# Patient Record
Sex: FEMALE | Race: WHITE | NOT HISPANIC OR LATINO | Employment: PART TIME | URBAN - METROPOLITAN AREA
[De-identification: names, ages, dates, MRNs, and addresses within clinical notes are randomized per-mention and may not be internally consistent; named-entity substitution may affect disease eponyms.]

---

## 2018-07-20 PROBLEM — F14.11 COCAINE ABUSE IN REMISSION (H): Status: ACTIVE | Noted: 2018-07-20

## 2018-07-20 PROBLEM — G89.29 CHRONIC BILATERAL THORACIC BACK PAIN: Status: ACTIVE | Noted: 2018-07-20

## 2018-07-20 PROBLEM — M54.6 CHRONIC BILATERAL THORACIC BACK PAIN: Status: ACTIVE | Noted: 2018-07-20

## 2018-07-20 PROBLEM — F17.210 CIGARETTE NICOTINE DEPENDENCE WITHOUT COMPLICATION: Status: ACTIVE | Noted: 2018-07-20

## 2018-07-20 PROBLEM — B35.1 ONYCHOMYCOSIS: Status: ACTIVE | Noted: 2018-07-20

## 2021-06-02 ENCOUNTER — RECORDS - HEALTHEAST (OUTPATIENT)
Dept: ADMINISTRATIVE | Facility: CLINIC | Age: 36
End: 2021-06-02

## 2024-02-12 ENCOUNTER — TELEPHONE (OUTPATIENT)
Dept: FAMILY MEDICINE | Facility: CLINIC | Age: 39
End: 2024-02-12

## 2024-02-12 ENCOUNTER — OFFICE VISIT (OUTPATIENT)
Dept: FAMILY MEDICINE | Facility: CLINIC | Age: 39
End: 2024-02-12

## 2024-02-12 ENCOUNTER — ANCILLARY PROCEDURE (OUTPATIENT)
Dept: MAMMOGRAPHY | Facility: CLINIC | Age: 39
End: 2024-02-12
Attending: FAMILY MEDICINE

## 2024-02-12 VITALS
OXYGEN SATURATION: 97 % | HEART RATE: 85 BPM | WEIGHT: 199 LBS | SYSTOLIC BLOOD PRESSURE: 114 MMHG | RESPIRATION RATE: 16 BRPM | TEMPERATURE: 98.3 F | BODY MASS INDEX: 33.97 KG/M2 | DIASTOLIC BLOOD PRESSURE: 73 MMHG | HEIGHT: 64 IN

## 2024-02-12 DIAGNOSIS — Z12.31 VISIT FOR SCREENING MAMMOGRAM: ICD-10-CM

## 2024-02-12 DIAGNOSIS — F41.1 GENERALIZED ANXIETY DISORDER: ICD-10-CM

## 2024-02-12 DIAGNOSIS — F17.200 NICOTINE DEPENDENCE, UNCOMPLICATED, UNSPECIFIED NICOTINE PRODUCT TYPE: ICD-10-CM

## 2024-02-12 DIAGNOSIS — Z00.00 ROUTINE GENERAL MEDICAL EXAMINATION AT A HEALTH CARE FACILITY: Primary | ICD-10-CM

## 2024-02-12 DIAGNOSIS — E78.5 HYPERLIPIDEMIA, UNSPECIFIED HYPERLIPIDEMIA TYPE: ICD-10-CM

## 2024-02-12 DIAGNOSIS — Z12.4 CERVICAL CANCER SCREENING: ICD-10-CM

## 2024-02-12 DIAGNOSIS — Z11.59 NEED FOR HEPATITIS C SCREENING TEST: ICD-10-CM

## 2024-02-12 PROBLEM — F19.10 POLYSUBSTANCE ABUSE (H): Status: ACTIVE | Noted: 2024-02-12

## 2024-02-12 LAB
ANION GAP SERPL CALCULATED.3IONS-SCNC: 9 MMOL/L (ref 7–15)
BUN SERPL-MCNC: 6.9 MG/DL (ref 6–20)
CALCIUM SERPL-MCNC: 9.1 MG/DL (ref 8.6–10)
CHLORIDE SERPL-SCNC: 106 MMOL/L (ref 98–107)
CHOLEST SERPL-MCNC: 239 MG/DL
CREAT SERPL-MCNC: 0.74 MG/DL (ref 0.51–0.95)
DEPRECATED HCO3 PLAS-SCNC: 26 MMOL/L (ref 22–29)
EGFRCR SERPLBLD CKD-EPI 2021: >90 ML/MIN/1.73M2
FASTING STATUS PATIENT QL REPORTED: ABNORMAL
GLUCOSE SERPL-MCNC: 85 MG/DL (ref 70–99)
HBA1C MFR BLD: 5.9 % (ref 0–5.6)
HCV AB SERPL QL IA: NONREACTIVE
HDLC SERPL-MCNC: 32 MG/DL
LDLC SERPL CALC-MCNC: 161 MG/DL
NONHDLC SERPL-MCNC: 207 MG/DL
POTASSIUM SERPL-SCNC: 4.1 MMOL/L (ref 3.4–5.3)
SODIUM SERPL-SCNC: 141 MMOL/L (ref 135–145)
TRIGL SERPL-MCNC: 229 MG/DL
TSH SERPL DL<=0.005 MIU/L-ACNC: 0.95 UIU/ML (ref 0.3–4.2)

## 2024-02-12 PROCEDURE — 86803 HEPATITIS C AB TEST: CPT | Performed by: FAMILY MEDICINE

## 2024-02-12 PROCEDURE — 80061 LIPID PANEL: CPT | Performed by: FAMILY MEDICINE

## 2024-02-12 PROCEDURE — 87624 HPV HI-RISK TYP POOLED RSLT: CPT | Performed by: FAMILY MEDICINE

## 2024-02-12 PROCEDURE — 80048 BASIC METABOLIC PNL TOTAL CA: CPT | Performed by: FAMILY MEDICINE

## 2024-02-12 PROCEDURE — 36415 COLL VENOUS BLD VENIPUNCTURE: CPT | Performed by: FAMILY MEDICINE

## 2024-02-12 PROCEDURE — 83036 HEMOGLOBIN GLYCOSYLATED A1C: CPT | Performed by: FAMILY MEDICINE

## 2024-02-12 PROCEDURE — 99386 PREV VISIT NEW AGE 40-64: CPT | Mod: 25 | Performed by: FAMILY MEDICINE

## 2024-02-12 PROCEDURE — G0145 SCR C/V CYTO,THINLAYER,RESCR: HCPCS | Performed by: FAMILY MEDICINE

## 2024-02-12 PROCEDURE — 84443 ASSAY THYROID STIM HORMONE: CPT | Performed by: FAMILY MEDICINE

## 2024-02-12 PROCEDURE — 90471 IMMUNIZATION ADMIN: CPT | Performed by: FAMILY MEDICINE

## 2024-02-12 PROCEDURE — 90746 HEPB VACCINE 3 DOSE ADULT IM: CPT | Performed by: FAMILY MEDICINE

## 2024-02-12 PROCEDURE — 77067 SCR MAMMO BI INCL CAD: CPT | Mod: TC | Performed by: STUDENT IN AN ORGANIZED HEALTH CARE EDUCATION/TRAINING PROGRAM

## 2024-02-12 PROCEDURE — 90686 IIV4 VACC NO PRSV 0.5 ML IM: CPT | Performed by: FAMILY MEDICINE

## 2024-02-12 PROCEDURE — 90677 PCV20 VACCINE IM: CPT | Performed by: FAMILY MEDICINE

## 2024-02-12 PROCEDURE — 90472 IMMUNIZATION ADMIN EACH ADD: CPT | Performed by: FAMILY MEDICINE

## 2024-02-12 RX ORDER — BUPROPION HYDROCHLORIDE 150 MG/1
TABLET, FILM COATED, EXTENDED RELEASE ORAL
Qty: 57 TABLET | Refills: 0 | Status: SHIPPED | OUTPATIENT
Start: 2024-02-12 | End: 2024-03-13

## 2024-02-12 RX ORDER — BUPROPION HYDROCHLORIDE 150 MG/1
150 TABLET, FILM COATED, EXTENDED RELEASE ORAL 2 TIMES DAILY
Qty: 60 TABLET | Refills: 2 | Status: SHIPPED | OUTPATIENT
Start: 2024-03-13

## 2024-02-12 SDOH — HEALTH STABILITY: PHYSICAL HEALTH: ON AVERAGE, HOW MANY DAYS PER WEEK DO YOU ENGAGE IN MODERATE TO STRENUOUS EXERCISE (LIKE A BRISK WALK)?: 4 DAYS

## 2024-02-12 ASSESSMENT — SOCIAL DETERMINANTS OF HEALTH (SDOH): HOW OFTEN DO YOU GET TOGETHER WITH FRIENDS OR RELATIVES?: PATIENT DECLINED

## 2024-02-12 NOTE — LETTER
February 12, 2024      Odette Bladnon Bebe  [address] [facility] [address]        To Whom It May Concern,     Ms. Odette Blandon Bebe saw me on 2/12/2024.    She has mental health concerns and the presence of her dog is helpful in relieving stress.  I think she should keep this  animal as a treatment for her conditions.    Sincerely,          Rory Farris MD

## 2024-02-12 NOTE — PATIENT INSTRUCTIONS
"Eating Healthy Foods: Care Instructions  With every meal, you can make healthy food choices. Try to eat a variety of fruits, vegetables, whole grains, lean proteins, and low-fat dairy products. This can help you get the right balance of nutrients, including vitamins and minerals. Small changes add up over time. You can start by adding one healthy food to your meals each day.    Try to make half your plate fruits and vegetables, one-fourth whole grains, and one-fourth lean proteins. Try including dairy with your meals.   Eat more fruits and vegetables. Try to have them with most meals and snacks.   Foods for healthy eating    Fruits    These can be fresh, frozen, canned, or dried.  Try to choose whole fruit rather than fruit juice.  Eat a variety of colors.    Vegetables    These can be fresh, frozen, canned, or dried.  Beans, peas, and lentils count too.    Whole grains    Choose whole-grain breads, cereals, and noodles.  Try brown rice.    Lean proteins    These can include lean meat, poultry, fish, and eggs.  You can also have tofu, beans, peas, lentils, nuts, and seeds.    Dairy    Try milk, yogurt, and cheese.  Choose low-fat or fat-free when you can.  If you need to, use lactose-free milk or fortified plant-based milk products, such as soy milk.    Water    Drink water when you're thirsty.  Limit sugar-sweetened drinks, including soda, fruit drinks, and sports drinks.  Where can you learn more?  Go to https://www.[employer].net/patiented  Enter T756 in the search box to learn more about \"Eating Healthy Foods: Care Instructions.\"  Current as of: February 28, 2023               Content Version: 13.8    2661-1053 [employer].   Care instructions adapted under license by your healthcare professional. If you have questions about a medical condition or this instruction, always ask your healthcare professional. [employer] disclaims any warranty or liability for your use of this " information.      Preventive Care Advice   This is general advice given by our system to help you stay healthy. However, your care team may have specific advice just for you. Please talk to your care team about your preventive care needs.  Nutrition  Eat 5 or more servings of fruits and vegetables each day.  Try wheat bread, brown rice and whole grain pasta (instead of white bread, rice, and pasta).  Get enough calcium and vitamin D. Check the label on foods and aim for 100% of the RDA (recommended daily allowance).  Lifestyle  Exercise at least 150 minutes each week  (30 minutes a day, 5 days a week).  Do muscle strengthening activities 2 days a week. These help control your weight and prevent disease.  No smoking.  Wear sunscreen to prevent skin cancer.  Have a dental exam and cleaning every 6 months.  Yearly exams  See your health care team every year to talk about:  Any changes in your health.  Any medicines your care team has prescribed.  Preventive care, family planning, and ways to prevent chronic diseases.  Shots (vaccines)   HPV shots (up to age 26), if you've never had them before.  Hepatitis B shots (up to age 59), if you've never had them before.  COVID-19 shot: Get this shot when it's due.  Flu shot: Get a flu shot every year.  Tetanus shot: Get a tetanus shot every 10 years.  Pneumococcal, hepatitis A, and RSV shots: Ask your care team if you need these based on your risk.  Shingles shot (for age 50 and up)  General health tests  Diabetes screening:  Starting at age 35, Get screened for diabetes at least every 3 years.  If you are younger than age 35, ask your care team if you should be screened for diabetes.  Cholesterol test: At age 39, start having a cholesterol test every 5 years, or more often if advised.  Bone density scan (DEXA): At age 50, ask your care team if you should have this scan for osteoporosis (brittle bones).  Hepatitis C: Get tested at least once in your life.  STIs (sexually  transmitted infections)  Before age 24: Ask your care team if you should be screened for STIs.  After age 24: Get screened for STIs if you're at risk. You are at risk for STIs (including HIV) if:  You are sexually active with more than one person.  You don't use condoms every time.  You or a partner was diagnosed with a sexually transmitted infection.  If you are at risk for HIV, ask about PrEP medicine to prevent HIV.  Get tested for HIV at least once in your life, whether you are at risk for HIV or not.  Cancer screening tests  Cervical cancer screening: If you have a cervix, begin getting regular cervical cancer screening tests starting at age 21.  Breast cancer scan (mammogram): If you've ever had breasts, begin having regular mammograms starting at age 40. This is a scan to check for breast cancer.  Colon cancer screening: It is important to start screening for colon cancer at age 45.  Have a colonoscopy test every 10 years (or more often if you're at risk) Or, ask your provider about stool tests like a FIT test every year or Cologuard test every 3 years.  To learn more about your testing options, visit:   https://www."[employer]/267678.pdf.  For help making a decision, visit:   https://bit.ly/gu29543.  Prostate cancer screening test: If you have a prostate, ask your care team if a prostate cancer screening test (PSA) at age 55 is right for you.  Lung cancer screening: If you are a current or former smoker ages 50 to 80, ask your care team if ongoing lung cancer screenings are right for you.  For informational purposes only. Not to replace the advice of your health care provider. Copyright   2023 [employer]. All rights reserved. Clinically reviewed by the [facility] Transitions Program. eCurv 360452 - REV 01/24.

## 2024-02-12 NOTE — TELEPHONE ENCOUNTER
Called and informed patient    ----- Message from Rory Farris MD sent at 2/12/2024  2:57 PM CST -----  Call:  Good news.  Normal mammogram.

## 2024-02-12 NOTE — PROGRESS NOTES
"Preventive Care Visit--New Patient  [facility]  Rory Farris MD, Family Medicine  Feb 12, 2024      Assessment & Plan     Routine general medical examination at a health care facility  Lipid screening with history of gestational diabetes.  - Hemoglobin A1c  - TSH with free T4 reflex  - Basic metabolic panel  (Ca, Cl, CO2, Creat, Gluc, K, Na, BUN)  - Lipid panel reflex to direct LDL Non-fasting    Generalized anxiety disorder  Wrote note for the dog.  Along with working on nicotine dependence, can treat anxiety with bupropion.  Discussed if worsening anxiety, needs to come back to discuss.    Nicotine dependence, uncomplicated, unspecified nicotine product type  Greater than 3 minutes spent in smoking cessation discussion.  - buPROPion (ZYBAN) 150 MG 12 hr tablet  Dispense: 60 tablet; Refill: 2    Cervical cancer screening  Pap smear done today.  - Pap Screen with HPV - recommended age 30 - 65 years    Visit for screening mammogram  Mammogram done today.  - *MA Screening Digital Bilateral    Need for hepatitis C screening test  Patient agrees to screening  - Hepatitis C Screen Reflex to HCV RNA Quant and Genotype    Nicotine/Tobacco Cessation  She reports that she has been smoking. She does not have any smokeless tobacco history on file.  Nicotine/Tobacco Cessation Plan  Pharmacotherapies : bupropion (Zyban)      BMI  Estimated body mass index is 33.74 kg/m  as calculated from the following:    Height as of this encounter: 1.636 m (5' 4.4\").    Weight as of this encounter: 90.3 kg (199 lb).     Counseling  Appropriate preventive services were discussed with this patient, including applicable screening as appropriate for fall prevention, nutrition, physical activity, Tobacco-use cessation, weight loss and cognition.  Checklist reviewing preventive services available has been given to the patient.  Reviewed patient's diet, addressing concerns and/or questions.   The patient was instructed to " see the dentist every 6 months.     Patient has been advised of split billing requirements and indicates understanding: Yes    Braden Pino is a 38 year old, presenting for the following:  Physical        2/12/2024     1:03 PM   Additional Questions   Roomed by hser   Accompanied by self        Health Care Directive  Patient does not have a Health Care Directive or Living Will: Discussed advance care planning with patient; however, patient declined at this time.    HPI  Patient presents for CPE.  I have not seen her for a while.  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  Reports she is smoking a lot.  Says 2 to 4 packs of cigarettes per day.  Reports she recently moved.  Needs a note to keep her dog.  Reports dog helps with anxiety symptoms.  Patient also treating that with THC.  Denies any other substance use.  Had a dream and woke convinced that she had cancer.  That prompted her to make a physical appointment.        2/12/2024   General Health   How would you rate your overall physical health? Good   Feel stress (tense, anxious, or unable to sleep) Not at all         2/12/2024   Nutrition   Three or more servings of calcium each day? Yes   Diet: Regular (no restrictions)   How many servings of fruit and vegetables per day? (!) 2-3   How many sweetened beverages each day? (!) 4+         2/12/2024   Exercise   Days per week of moderate/strenous exercise 4 days         2/12/2024   Social Factors   Frequency of gathering with friends or relatives Patient declined   Worry food won't last until get money to buy more No   Food not last or not have enough money for food? No   Do you have housing?  Yes   Are you worried about losing your housing? No   Lack of transportation? No   Unable to get utilities (heat,electricity)? No         2/12/2024   Dental   Dentist two times every year? (!) NO         2/12/2024   TB Screening   Were you born outside of [place]?  No     Today's PHQ-2 Score:       2/12/2024     12:57 PM   PHQ-2 (  [employer])   Q1: Little interest or pleasure in doing things 1   Q2: Feeling down, depressed or hopeless 0   PHQ-2 Score 1   Q1: Little interest or pleasure in doing things Several days   Q2: Feeling down, depressed or hopeless Not at all   PHQ-2 Score 1           2024   Substance Use   Alcohol more than 3/day or more than 7/wk Not Applicable   Do you use any other substances recreationally? No     Social History     Tobacco Use    Smoking status: Every Day   Substance Use Topics    Alcohol use: Yes    Drug use: Yes             2024   Breast Cancer Screening   Family history of breast, colon, or ovarian cancer? Yes         2024   LAST FHS-7 RESULTS   1st degree relative breast or ovarian cancer Unknown   Any relative bilateral breast cancer Yes   Any male have breast cancer No   Any woman have breast and ovarian cancer Unknown   Any woman with breast cancer before 50yrs Unknown   2 or more relatives with breast and/or ovarian cancer Yes   2 or more relatives with breast and/or bowel cancer Unknown     Discussed potential referral to genetics.  Patient declined.  Mammogram Screening - Mammogram every 1-2 years updated in Health Maintenance based on mutual decision making        2024   STI Screening   New sexual partner(s) since last STI/HIV test? No     History of abnormal Pap smear: NO - age 30-65 PAP every 5 years with negative HPV co-testing recommended       The ASCVD Risk score ([place] SEARS, et al., 2019) failed to calculate for the following reasons:    Cannot find a previous HDL lab    Cannot find a previous total cholesterol lab       Reviewed and updated as needed this visit by Provider      Problems  Med Hx  Surg Hx  Fam Hx            Past Medical History:   Diagnosis Date    Cocaine abuse (H)     History of ankle surgery     PCP abuse (H) 2016     Past Surgical History:   Procedure Laterality Date    ANKLE SURGERY      ZZC  DELIVERY ONLY       Description:  Section;  Recorded: 2009;  Comments: emergent ,; planned     [facility] LIGATE FALLOPIAN TUBE      Description: Tubal Ligation;  Recorded: 01/10/2013;     OB History    Para Term  AB Living   4 3 3 0 0 4   SAB IAB Ectopic Multiple Live Births   0 0 0 1 4      # Outcome Date GA Lbr Mike/2nd Weight Sex Delivery Anes PTL Lv   4 Term 11 39w0d   F CS-Unspec   DAMARIS   3 Term 06/16/10 39w0d   F CS-Unspec   DAMARIS   2A Term 02 42w0d 18:00 3.997 kg (8 lb 13 oz) F CS-Unspec  N DAMARIS      Birth Comments: 8 pounds 13 ounces fetal distress with epidural      Name: Laine   2B Term 05 39w0d 00:01 3.062 kg (6 lb 12 oz) F CS-Unspec  N DAMARIS      Birth Comments: 6 pounds 12 ounces gestational diabetes on insulin      Name: Danna   1               BP Readings from Last 3 Encounters:   24 114/73    Wt Readings from Last 3 Encounters:   24 90.3 kg (199 lb)                  Patient Active Problem List   Diagnosis    Overweight    Nicotine Dependence    Hoarseness    Mild tetrahydrocannabinol (THC) abuse    Polysubstance abuse (H)    Generalized anxiety disorder     Past Surgical History:   Procedure Laterality Date    ANKLE SURGERY      ZZC  DELIVERY ONLY      Description:  Section;  Recorded: 2009;  Comments: emergent ,; planned     [facility] LIGATE FALLOPIAN TUBE      Description: Tubal Ligation;  Recorded: 01/10/2013;       Social History     Tobacco Use    Smoking status: Every Day    Smokeless tobacco: Not on file   Substance Use Topics    Alcohol use: Yes     Family History   Problem Relation Age of Onset    Diabetes Type 2  Father     Diabetes Type 2  Sister     Breast Cancer Paternal Grandmother         unsure of age    Breast Cancer Paternal Aunt         unsure of age         Current Outpatient Medications   Medication Sig Dispense Refill    buPROPion (ZYBAN) 150 MG 12 hr tablet Take 1 tablet (150 mg) by mouth every morning for 3  "days, THEN 1 tablet (150 mg) 2 times daily for 27 days. Take 2nd dose no later than 4pm 57 tablet 0    [START ON 3/13/2024] buPROPion (ZYBAN) 150 MG 12 hr tablet Take 1 tablet (150 mg) by mouth 2 times daily After your quit date treatment generally continues 7-12 weeks. Take your afternoon dose no later than 4pm 60 tablet 2    ibuprofen (ADVIL,MOTRIN) 800 MG tablet [IBUPROFEN (ADVIL,MOTRIN) 800 MG TABLET] Take 1 tablet (800 mg total) by mouth every 8 (eight) hours as needed for pain. 30 tablet 0    VITAMIN E, DL,TOCOPHERYL ACET, (VITAMIN E, DL, ACETATE, ORAL) [VITAMIN E, DL,TOCOPHERYL ACET, (VITAMIN E, DL, ACETATE, ORAL)] Take by mouth. (Patient not taking: Reported on 2/12/2024)       No Known Allergies  Recent Labs   Lab Test 02/12/24  1433   A1C 5.9*         Objective    Exam  /73 (BP Location: Left arm, Patient Position: Sitting, Cuff Size: Adult Regular)   Pulse 85   Temp 98.3  F (36.8  C) (Temporal)   Resp 16   Ht 1.636 m (5' 4.4\")   Wt 90.3 kg (199 lb)   LMP 01/20/2024 (Exact Date)   SpO2 97%   BMI 33.74 kg/m     Estimated body mass index is 33.74 kg/m  as calculated from the following:    Height as of this encounter: 1.636 m (5' 4.4\").    Weight as of this encounter: 90.3 kg (199 lb).    Physical Exam  Gen:   Alert, not distressed  Head:   Normocephalic, without obvious abnormality, atraumatic  Eyes:   PERRL, conjunctiva/corneas clear, EOM's intact  Ears:   Normal tympanic membranes and external ear canals  Nose:    Mucosa normal, no drainage or sinus tenderness  Throat:    No erythema or exudates  Neck:    No adenopathy no nodules in thyroid, normal ROM  Lungs:    Clear to auscultation bilaterally, respirations unlabored  Chest wall:  No tenderness or deformity  Breast:  Patient declined exam  Heart:     Regular, normal S1 and S2, no murmur, gallop or rub  Abdomen:  Soft, non-tender, normal bowel sounds, no masses, no organomegaly  Back:    Symmetric, no curvature, ROM normal, no CVA " tenderness  Genitalia:    Normal labia, vaginal mucosa, cervix.   Extremities:   Extremities normal, atraumatic, no cyanosis or edema  Skin:     Skin color, texture, turgor normal, no rashes or lesions  Lymph nodes:   Cervical and supraclavicular nodes normal  Neurologic:   CNII-XII intact.   DTRs normal and symmetric.  Symmetric strength and sensation.    Recent Results (from the past 24 hour(s))   Hemoglobin A1c    Collection Time: 02/12/24  2:33 PM   Result Value Ref Range    Hemoglobin A1C 5.9 (H) 0.0 - 5.6 %        Signed Electronically by: Rory Farris MD        Prior to immunization administration, verified patients identity using patient s name and date of birth. Please see Immunization Activity for additional information.     Screening Questionnaire for Adult Immunization    Are you sick today?   No   Do you have allergies to medications, food, a vaccine component or latex?   No   Have you ever had a serious reaction after receiving a vaccination?   No   Do you have a long-term health problem with heart, lung, kidney, or metabolic disease (e.g., diabetes), asthma, a blood disorder, no spleen, complement component deficiency, a cochlear implant, or a spinal fluid leak?  Are you on long-term aspirin therapy?   No   Do you have cancer, leukemia, HIV/AIDS, or any other immune system problem?   No   Do you have a parent, brother, or sister with an immune system problem?   No   In the past 3 months, have you taken medications that affect  your immune system, such as prednisone, other steroids, or anticancer drugs; drugs for the treatment of rheumatoid arthritis, Crohn s disease, or psoriasis; or have you had radiation treatments?   No   Have you had a seizure, or a brain or other nervous system problem?   No   During the past year, have you received a transfusion of blood or blood    products, or been given immune (gamma) globulin or antiviral drug?   No   For women: Are you pregnant or is there a chance you  could become       pregnant during the next month?   No   Have you received any vaccinations in the past 4 weeks?   No     Immunization questionnaire answers were all negative.      Patient instructed to remain in clinic for 15 minutes afterwards, and to report any adverse reactions.     Screening performed by Yara Saha MA on 2/12/2024 at 1:07 PM.

## 2024-02-13 ENCOUNTER — TELEPHONE (OUTPATIENT)
Dept: FAMILY MEDICINE | Facility: CLINIC | Age: 39
End: 2024-02-13

## 2024-02-13 RX ORDER — ATORVASTATIN CALCIUM 40 MG/1
40 TABLET, FILM COATED ORAL DAILY
Qty: 90 TABLET | Refills: 1 | Status: SHIPPED | OUTPATIENT
Start: 2024-02-13

## 2024-02-13 NOTE — TELEPHONE ENCOUNTER
Contacted patient with message below from Dr Farris.  Patient will call clinic back to schedule an appointment to recheck cholesterol.  No further action needed.    Melissa Yeboah RN  [facility]    ----- Message from Yara Saha MA sent at 2/13/2024  9:11 AM CST -----    ----- Message -----  From: Rory Farris MD  Sent: 2/13/2024   9:10 AM CST  To: [facility]on Valade[facility] Care Team Pool    Call:  Hepatitis C, electrolyte and kidney tests, thyroid tests are all normal.  Cholesterol is high.  With smoking, risk of heart attack and stroke is high enough to recommend a cholesterol medicine.  I will send a prescription to your pharmacy.  We should recheck in 1 to 2 months.

## 2024-02-15 LAB
BKR LAB AP GYN ADEQUACY: NORMAL
BKR LAB AP GYN INTERPRETATION: NORMAL
BKR LAB AP HPV REFLEX: NORMAL
BKR LAB AP LMP: NORMAL
BKR LAB AP PREVIOUS ABNORMAL: NORMAL
PATH REPORT.COMMENTS IMP SPEC: NORMAL
PATH REPORT.COMMENTS IMP SPEC: NORMAL
PATH REPORT.RELEVANT HX SPEC: NORMAL

## 2024-02-17 LAB
HUMAN PAPILLOMA VIRUS 16 DNA: NEGATIVE
HUMAN PAPILLOMA VIRUS 18 DNA: NEGATIVE
HUMAN PAPILLOMA VIRUS FINAL DIAGNOSIS: NORMAL
HUMAN PAPILLOMA VIRUS OTHER HR: NEGATIVE

## 2025-02-19 ENCOUNTER — OFFICE VISIT (OUTPATIENT)
Dept: FAMILY MEDICINE | Facility: CLINIC | Age: 40
End: 2025-02-19

## 2025-02-19 ENCOUNTER — MYC REFILL (OUTPATIENT)
Dept: PEDIATRICS | Facility: CLINIC | Age: 40
End: 2025-02-19

## 2025-02-19 VITALS
BODY MASS INDEX: 36.82 KG/M2 | SYSTOLIC BLOOD PRESSURE: 125 MMHG | DIASTOLIC BLOOD PRESSURE: 82 MMHG | HEART RATE: 83 BPM | RESPIRATION RATE: 18 BRPM | HEIGHT: 65 IN | TEMPERATURE: 97.4 F | OXYGEN SATURATION: 95 % | WEIGHT: 221 LBS

## 2025-02-19 DIAGNOSIS — F17.210 CIGARETTE NICOTINE DEPENDENCE WITHOUT COMPLICATION: ICD-10-CM

## 2025-02-19 DIAGNOSIS — L60.8 TOENAIL DEFORMITY: ICD-10-CM

## 2025-02-19 DIAGNOSIS — Z11.3 SCREEN FOR STD (SEXUALLY TRANSMITTED DISEASE): ICD-10-CM

## 2025-02-19 DIAGNOSIS — B35.1 ONYCHOMYCOSIS: Primary | ICD-10-CM

## 2025-02-19 LAB
ALBUMIN SERPL BCG-MCNC: 4.3 G/DL (ref 3.5–5.2)
ALP SERPL-CCNC: 87 U/L (ref 40–150)
ALT SERPL W P-5'-P-CCNC: 15 U/L (ref 0–50)
AST SERPL W P-5'-P-CCNC: 19 U/L (ref 0–45)
BILIRUB DIRECT SERPL-MCNC: <0.08 MG/DL (ref 0–0.3)
BILIRUB SERPL-MCNC: 0.2 MG/DL
CLUE CELLS: ABNORMAL
HCV AB SERPL QL IA: NONREACTIVE
HIV 1+2 AB+HIV1 P24 AG SERPL QL IA: NONREACTIVE
PROT SERPL-MCNC: 7.4 G/DL (ref 6.4–8.3)
T PALLIDUM AB SER QL: NONREACTIVE
TRICHOMONAS, WET PREP: ABNORMAL
WBC'S/HIGH POWER FIELD, WET PREP: ABNORMAL
YEAST, WET PREP: ABNORMAL

## 2025-02-19 PROCEDURE — 36415 COLL VENOUS BLD VENIPUNCTURE: CPT | Performed by: PHYSICIAN ASSISTANT

## 2025-02-19 PROCEDURE — 86803 HEPATITIS C AB TEST: CPT | Performed by: PHYSICIAN ASSISTANT

## 2025-02-19 PROCEDURE — 87210 SMEAR WET MOUNT SALINE/INK: CPT | Performed by: PHYSICIAN ASSISTANT

## 2025-02-19 PROCEDURE — 99214 OFFICE O/P EST MOD 30 MIN: CPT | Performed by: PHYSICIAN ASSISTANT

## 2025-02-19 PROCEDURE — 87389 HIV-1 AG W/HIV-1&-2 AB AG IA: CPT | Performed by: PHYSICIAN ASSISTANT

## 2025-02-19 PROCEDURE — 86780 TREPONEMA PALLIDUM: CPT | Performed by: PHYSICIAN ASSISTANT

## 2025-02-19 PROCEDURE — 80076 HEPATIC FUNCTION PANEL: CPT | Performed by: PHYSICIAN ASSISTANT

## 2025-02-19 PROCEDURE — 87591 N.GONORRHOEAE DNA AMP PROB: CPT | Performed by: PHYSICIAN ASSISTANT

## 2025-02-19 PROCEDURE — 87491 CHLMYD TRACH DNA AMP PROBE: CPT | Performed by: PHYSICIAN ASSISTANT

## 2025-02-19 RX ORDER — TERBINAFINE HYDROCHLORIDE 250 MG/1
250 TABLET ORAL DAILY
Qty: 90 TABLET | Refills: 0 | Status: SHIPPED | OUTPATIENT
Start: 2025-02-19 | End: 2025-05-20

## 2025-02-19 NOTE — PROGRESS NOTES
Subjective:      Odette Spence is a 39 year old female with chief complaint of 2 concerns    1.  Toenail problems  Long history of toenail fungus.  Both feet, almost all toenails.  She says she has had it for 15 years.  She has tried all over-the-counter options.  It also sounds like she perhaps is tried topical prescriptions as well.  Additionally, her right great toe nail has grown back and is quite long and miss shaped.  She says she has had that toenail removed twice before and they have done what sounds like phenol treatment.  She says none of it has worked and her nail has grown back.  It is painful.    2.  STD screen  Had unprotected sex with a different partner several months ago.  Has not been checked for STDs yet and wants to do that today.  No stones, no known exposures.    Patient Active Problem List   Diagnosis    Overweight    Nicotine Dependence    Hoarseness    Mild tetrahydrocannabinol (THC) abuse    Polysubstance abuse (H)    Generalized anxiety disorder    Cigarette nicotine dependence without complication    Onychomycosis    Chronic bilateral thoracic back pain    Cocaine abuse in remission (H)       Current Outpatient Medications:     atorvastatin (LIPITOR) 40 MG tablet, Take 1 tablet (40 mg) by mouth daily, Disp: 90 tablet, Rfl: 1    buPROPion (ZYBAN) 150 MG 12 hr tablet, Take 1 tablet (150 mg) by mouth 2 times daily After your quit date treatment generally continues 7-12 weeks. Take your afternoon dose no later than 4pm, Disp: 60 tablet, Rfl: 2    ibuprofen (ADVIL,MOTRIN) 800 MG tablet, [IBUPROFEN (ADVIL,MOTRIN) 800 MG TABLET] Take 1 tablet (800 mg total) by mouth every 8 (eight) hours as needed for pain., Disp: 30 tablet, Rfl: 0    terbinafine (LAMISIL) 250 MG tablet, Take 1 tablet (250 mg) by mouth daily., Disp: 90 tablet, Rfl: 0    nicotine (NICODERM CQ) 21 MG/24HR 24 hr patch, Place 1 patch onto the skin every 24 hours (Patient not taking: Reported on 2/19/2025), Disp: 45  "patch, Rfl: 0    nicotine polacrilex ([employer] NICOTINE POLACRILEX) 4 MG lozenge, Place 1 lozenge (4 mg) inside cheek as needed for smoking cessation (Patient not taking: Reported on 2/19/2025), Disp: 360 tablet, Rfl: 0     Objective:     Allergies:  Patient has no known allergies.    Vitals:  /82 (BP Location: Left arm, Patient Position: Sitting, Cuff Size: Adult Large)   Pulse 83   Temp 97.4  F (36.3  C) (Temporal)   Resp 18   Ht 1.64 m (5' 4.57\")   Wt 100.2 kg (221 lb)   LMP  (LMP Unknown)   SpO2 95%   BMI 37.27 kg/m    Body mass index is 37.27 kg/m .    Vital signs reviewed.  General: Patient is alert and oriented x 3, in no apparent distress, appropriately groomed today with normal affect  Skin: Right foot and toenails were examined, right great toenail has significant hypertrophy and is curving around the edge of her great toe, other toenails look like they possibly have fungus but much more mild    Today's labs pending.      Assessment and Plan:   1. Onychomycosis (Primary)  Chronic, not improving.  Patient states she has tried all over-the-counter treatments.  She believes she has tried topical prescription treatments as well.  I discussed option of oral Lamisil with her.  She would like to try this.  I discussed sometimes it can affect your liver, will check liver tests today and then will recommend she check those again in a few months.  No significant alcohol use, she reports no drug use, she is a cigarette smoker.  - Hepatic panel (Albumin, ALT, AST, Bili, Alk Phos, TP); Future  - Orthopedic  Referral; Future  - terbinafine (LAMISIL) 250 MG tablet; Take 1 tablet (250 mg) by mouth daily.  Dispense: 90 tablet; Refill: 0    2. Toenail deformity  Chronic, worsening.  Right toenail deformity.  Patient has had toenail removed with phenol applied at least twice in the past.  Continues to grow back and cause problems.  Referral made to podiatry for follow-up on this.  - Orthopedic  " Referral; Future    3. Screen for STD (sexually transmitted disease)  New concern.  Screening labs ordered and I will follow-up with results.  - HIV Antigen Antibody Combo; Future  - Treponema Abs w Reflex to RPR and Titer; Future  - Hepatitis C antibody; Future  - Chlamydia trachomatis/Neisseria gonorrhoeae by PCR  - Wet preparation      This dictation uses voice recognition software, which may contain typographical errors.

## 2025-02-20 ENCOUNTER — PATIENT OUTREACH (OUTPATIENT)
Dept: CARE COORDINATION | Facility: CLINIC | Age: 40
End: 2025-02-20

## 2025-02-20 LAB
C TRACH DNA SPEC QL PROBE+SIG AMP: NEGATIVE
N GONORRHOEA DNA SPEC QL NAA+PROBE: NEGATIVE
SPECIMEN TYPE: NORMAL

## 2025-02-20 RX ORDER — NICOTINE 21 MG/24HR
1 PATCH, TRANSDERMAL 24 HOURS TRANSDERMAL EVERY 24 HOURS
Qty: 45 PATCH | Refills: 0 | Status: SHIPPED | OUTPATIENT
Start: 2025-02-20

## 2025-02-26 NOTE — TELEPHONE ENCOUNTER
DIAGNOSIS: Onychomycosis [B35.1]  Toenail deformity [L60.8] / [facility] Blankens[facility]  self pay    APPOINTMENT DATE: 4/1/25   NOTES STATUS DETAILS   OFFICE NOTE from referring provider Internal 2/19/25 - Selena Blankenship PA-C - Family Med    MEDICATION LIST Internal

## 2025-03-08 ENCOUNTER — HEALTH MAINTENANCE LETTER (OUTPATIENT)
Age: 40
End: 2025-03-08

## 2025-03-08 DIAGNOSIS — B35.1 ONYCHOMYCOSIS: Primary | ICD-10-CM

## 2025-03-18 ENCOUNTER — OFFICE VISIT (OUTPATIENT)
Dept: FAMILY MEDICINE | Facility: CLINIC | Age: 40
End: 2025-03-18

## 2025-03-18 VITALS
TEMPERATURE: 98.1 F | BODY MASS INDEX: 37.95 KG/M2 | HEART RATE: 82 BPM | RESPIRATION RATE: 18 BRPM | SYSTOLIC BLOOD PRESSURE: 122 MMHG | DIASTOLIC BLOOD PRESSURE: 76 MMHG | WEIGHT: 225 LBS | OXYGEN SATURATION: 98 %

## 2025-03-18 DIAGNOSIS — F17.200 TOBACCO USE DISORDER: Primary | ICD-10-CM

## 2025-03-18 PROCEDURE — 99207 PR NO CHARGE LOS: CPT | Performed by: PHYSICIAN ASSISTANT

## 2025-03-18 NOTE — PROGRESS NOTES
Prior to immunization administration, verified patients identity using patient s name and date of birth. Please see Immunization Activity for additional information.     Screening Questionnaire for Adult Immunization    Are you sick today?   No   Do you have allergies to medications, food, a vaccine component or latex?   No   Have you ever had a serious reaction after receiving a vaccination?   No   Do you have a long-term health problem with heart, lung, kidney, or metabolic disease (e.g., diabetes), asthma, a blood disorder, no spleen, complement component deficiency, a cochlear implant, or a spinal fluid leak?  Are you on long-term aspirin therapy?   No   Do you have cancer, leukemia, HIV/AIDS, or any other immune system problem?   No   Do you have a parent, brother, or sister with an immune system problem?   No   In the past 3 months, have you taken medications that affect  your immune system, such as prednisone, other steroids, or anticancer drugs; drugs for the treatment of rheumatoid arthritis, Crohn s disease, or psoriasis; or have you had radiation treatments?   No   Have you had a seizure, or a brain or other nervous system problem?   No   During the past year, have you received a transfusion of blood or blood    products, or been given immune (gamma) globulin or antiviral drug?   No   For women: Are you pregnant or is there a chance you could become       pregnant during the next month?   No   Have you received any vaccinations in the past 4 weeks?   No     Immunization questionnaire answers were all negative.      Patient instructed to remain in clinic for 15 minutes afterwards, and to report any adverse reactions.     Screening performed by Taqueria Lopez MA on 3/18/2025 at 8:16 AM.

## 2025-03-18 NOTE — PROGRESS NOTES
"I saw patient for office visit on 2/19/2025.  That was my first visit with her.  Primary concerns at that visit were toenail deformities and fungus, and then STD screening.  Patient uses [facility].  I wrote in results in [facility] that all of her STD results were negative on 3/8/2025.  Around the same time as our office visit on 2/19/25, we got requests to refill Zyban and nicotine patches for smoking cessation.  This refill was routed to me.  I asked our staff to try to clarify if she wanted 1 or both of these medicines, if she had stopped taking them or was continuing to take them.  Patient responded, but information was still not clear.  I asked that she make a visit to discuss this, video/phone visit okay.  She was then scheduled for an office visit today.  Today, she says she is \"not sure why I am here.\"    I reviewed with her that I think this visit is to discuss refills of nicotine patches or Zyban.  She says sometimes she takes the patches, but only when she can afford them.  Patient became upset, saying she does not have money for this visit, or time for the visit.  She then stated she was upset that she had not heard the results of her STD testing, and assumed a call from us to come back for a visit meant that she had tested positive for something.  I said I was sorry for the confusion, and then reviewed with her that I had written \"STD tests all negative\" on 3/8/25 in her MyChart.  Patient says she never saw this.    Patient was also upset because she had been \"waiting for a long time\" today.  I reviewed with her that her appointment time with me was 8:30, her arrival time was 8:10, so we had time to take her vital signs and get her roomed appropriately.  I believe I came in to her room today at approximately 8:40.  Patient was still very frustrated.  I discussed with her that if she would like to be seen, I am happy to see her today, also if she would rather cancel the appointment we could cancel it and not " charge her.    She then said she wanted to discuss medical marijuana, for both pain and mental health.  I discussed with her we would need to have an office visit for that, which we could attempt to do now.  If it is a pain issue, we would likely have her see a pain clinic.  If it were primarily for mental health we could at least start with her seeing a psychiatrist.  (I don't think she is seeing anyone for pain or mental health right now.)  She does have history of polysubstance abuse.    Patient then became very upset and abruptly ended the visit and walked out.    No charge for this visit.

## 2025-04-01 ENCOUNTER — OFFICE VISIT (OUTPATIENT)
Dept: ORTHOPEDICS | Facility: CLINIC | Age: 40
End: 2025-04-01
Attending: PHYSICIAN ASSISTANT

## 2025-04-01 ENCOUNTER — PRE VISIT (OUTPATIENT)
Dept: ORTHOPEDICS | Facility: CLINIC | Age: 40
End: 2025-04-01

## 2025-04-01 VITALS — HEIGHT: 65 IN | BODY MASS INDEX: 35.82 KG/M2 | WEIGHT: 215 LBS

## 2025-04-01 DIAGNOSIS — B35.1 ONYCHOMYCOSIS: ICD-10-CM

## 2025-04-01 DIAGNOSIS — B35.3 TINEA PEDIS OF BOTH FEET: Primary | ICD-10-CM

## 2025-04-01 RX ORDER — TERBINAFINE HYDROCHLORIDE 250 MG/1
250 TABLET ORAL DAILY
Qty: 90 TABLET | Refills: 0 | Status: SHIPPED | OUTPATIENT
Start: 2025-04-01 | End: 2025-06-30

## 2025-04-01 NOTE — NURSING NOTE
"Reason For Visit:   Chief Complaint   Patient presents with    Consult     Onychomycosis and Toenail deformity referred by Selena Blankenship PA-C          39 year old  1985      Primary MD: Selena Blankenship  Ref. MD: Selena Blankenship      Ht 1.64 m (5' 4.57\")   Wt 97.5 kg (215 lb)   LMP  (LMP Unknown)   BMI 36.26 kg/m      Pain Assessment  Patient Currently in Pain: Yes  0-10 Pain Scale: 4 (4 left foot, 5 in right foot)  Primary Pain Location: Foot (bilateral)            Tanvir Booker ATC    "
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
